# Patient Record
Sex: FEMALE | Race: WHITE | NOT HISPANIC OR LATINO | Employment: OTHER | URBAN - METROPOLITAN AREA
[De-identification: names, ages, dates, MRNs, and addresses within clinical notes are randomized per-mention and may not be internally consistent; named-entity substitution may affect disease eponyms.]

---

## 2018-04-06 ENCOUNTER — PATIENT OUTREACH (OUTPATIENT)
Dept: HEALTH INFORMATION MANAGEMENT | Facility: OTHER | Age: 83
End: 2018-04-06

## 2018-04-06 NOTE — PROGRESS NOTES
Attempt #:FINAL    [facility] Verified: no  Verify PCP: yes    Communication Preference Obtained: yes     Review Care Team: yes    Annual Wellness Visit Scheduling  1. Scheduling Status:Scheduled       Care Gap Scheduling (Attempt to Schedule EACH Overdue Care Gap!)  Health Maintenance Due   Topic Date Due   • Annual Wellness Visit  11/10/[birth year]27   • IMM ZOSTER VACCINE  11/10/1987   • IMM PNEUMOCOCCAL 65+ (ADULT) LOW/MEDIUM RISK SERIES (2 of 2 - PPSV23) 01/01/2013   • MAMMOGRAM  07/18/2017

## 2019-05-30 PROBLEM — I49.9 IRREGULAR HEART BEAT: Status: ACTIVE | Noted: 2019-05-30

## 2019-12-09 PROBLEM — R05.9 COUGH: Status: ACTIVE | Noted: 2019-12-09

## 2021-03-03 PROBLEM — K86.9 PANCREATIC LESION: Status: ACTIVE | Noted: 2021-03-03

## 2021-03-04 PROBLEM — R07.9 CHEST PAIN: Status: ACTIVE | Noted: 2021-03-04

## 2021-03-24 PROBLEM — R03.0 ELEVATED BLOOD PRESSURE READING: Status: ACTIVE | Noted: 2021-03-24

## 2021-03-24 PROBLEM — D49.0 NEOPLASM OF DIGESTIVE SYSTEM: Status: ACTIVE | Noted: 2021-03-24

## 2021-04-05 PROBLEM — K56.609 SBO (SMALL BOWEL OBSTRUCTION) (HCC): Status: ACTIVE | Noted: 2021-04-05
